# Patient Record
Sex: FEMALE | Race: WHITE | Employment: FULL TIME | URBAN - METROPOLITAN AREA
[De-identification: names, ages, dates, MRNs, and addresses within clinical notes are randomized per-mention and may not be internally consistent; named-entity substitution may affect disease eponyms.]

---
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
## 2025-01-16 ENCOUNTER — OFFICE VISIT (OUTPATIENT)
Dept: URGENT CARE | Age: 20
End: 2025-01-16
Payer: COMMERCIAL

## 2025-01-16 VITALS
OXYGEN SATURATION: 98 % | TEMPERATURE: 98.2 F | DIASTOLIC BLOOD PRESSURE: 83 MMHG | HEART RATE: 65 BPM | SYSTOLIC BLOOD PRESSURE: 123 MMHG

## 2025-01-16 DIAGNOSIS — R10.2 PELVIC PAIN: Primary | ICD-10-CM

## 2025-01-16 DIAGNOSIS — N92.4 EXCESSIVE BLEEDING IN PREMENOPAUSAL PERIOD: ICD-10-CM

## 2025-01-16 PROCEDURE — 99203 OFFICE O/P NEW LOW 30 MIN: CPT | Performed by: PHYSICIAN ASSISTANT

## 2025-01-16 ASSESSMENT — ENCOUNTER SYMPTOMS
FREQUENCY: 0
DYSURIA: 0
CHILLS: 0
FEVER: 0
NAUSEA: 0
ABDOMINAL PAIN: 1

## 2025-01-16 NOTE — PROGRESS NOTES
Subjective   Patient ID: Gosia Walsh is a 19 y.o. female. They present today with a chief complaint of Pelvic Pain (Pt got an IUD 12/11/25 and since then has been having discharge, lots of pain, and blood clots).    History of Present Illness  Patient presents today for her pelvic pain and heavy vaginal bleeding that started after she players that [facility] on December 11th. She was told that she would need to go to an urgent care to be evaluated. She states her pain ranges from 78 out of 10 pain scale and got so bad the other day she almost passed out. She states that she has to change her pad every one to two hours and they are soaked through with blood clots. She does at times feel fatigued and lightheaded. She has tried taking over the counter iron supplements without improvement. She states she also has concurrent depo provera injections. She denies any fevers, chills, painful urination, changes in urinary frequency or urgency. She has had some vaginal discharge that she describes as chewed taffy. She has no history of gonorrhea, chlamydia or trichomonas.      Pelvic Pain  Associated symptoms: abdominal pain    Associated symptoms: no fever and no nausea        Past Medical History  Allergies as of 01/16/2025    (No Known Allergies)       (Not in a hospital admission)       No past medical history on file.    No past surgical history on file.         Review of Systems  Review of Systems   Constitutional:  Negative for chills and fever.   Gastrointestinal:  Positive for abdominal pain. Negative for nausea.   Genitourinary:  Positive for pelvic pain, vaginal bleeding and vaginal discharge. Negative for dysuria, frequency and urgency.                                  Objective    Vitals:    01/16/25 1222   BP: 123/83   Pulse: 65   Temp: 36.8 °C (98.2 °F)   SpO2: 98%     No LMP recorded.    Physical Exam  Vitals reviewed.   Constitutional:       General: She is not in acute distress.  Cardiovascular:       Rate and Rhythm: Normal rate and regular rhythm.      Heart sounds: Normal heart sounds.   Pulmonary:      Effort: Pulmonary effort is normal. No respiratory distress.      Breath sounds: Normal breath sounds.   Abdominal:      General: Abdomen is flat. Bowel sounds are normal.      Palpations: Abdomen is soft.      Tenderness: There is abdominal tenderness (suprapubic). There is no right CVA tenderness, left CVA tenderness, guarding or rebound.   Genitourinary:     Comments: declined        Procedures    Point of Care Test & Imaging Results from this visit  No results found for this visit on 01/16/25.   No results found.    Diagnostic study results (if any) were reviewed by Clementina Vo PA-C.    Assessment/Plan   Allergies, medications, history, and pertinent labs/EKGs/Imaging reviewed by Clementina Vo PA-C.     Medical Decision Making  Patient presents for pelvic pain and heavy vaginal bleeding since insertion of IUD in December. We discussed the limitations of what we are able to assess for here through urgent care period I did recommend patient go to the emergency room for further evaluation of her symptoms. Patient was agreeable with plan. Vital signs are stable, no need for emergent transport. Patient is agreeable to have her boyfriend drive her to the emergency room.    Orders and Diagnoses  Diagnoses and all orders for this visit:  Pelvic pain  Excessive bleeding in premenopausal period      Medical Admin Record      Patient disposition: ED    Electronically signed by Clementina Vo PA-C  12:48 PM